# Patient Record
Sex: MALE | Race: WHITE | Employment: FULL TIME | URBAN - METROPOLITAN AREA
[De-identification: names, ages, dates, MRNs, and addresses within clinical notes are randomized per-mention and may not be internally consistent; named-entity substitution may affect disease eponyms.]

---

## 2020-12-24 ENCOUNTER — HOSPITAL ENCOUNTER (EMERGENCY)
Facility: CLINIC | Age: 20
Discharge: HOME | End: 2020-12-24
Attending: EMERGENCY MEDICINE | Admitting: EMERGENCY MEDICINE
Payer: OTHER MISCELLANEOUS

## 2020-12-24 VITALS
TEMPERATURE: 98.6 F | RESPIRATION RATE: 16 BRPM | OXYGEN SATURATION: 97 % | DIASTOLIC BLOOD PRESSURE: 71 MMHG | SYSTOLIC BLOOD PRESSURE: 116 MMHG | HEART RATE: 69 BPM

## 2020-12-24 DIAGNOSIS — S61.002A OPEN WOUND OF LEFT THUMB, INITIAL ENCOUNTER: ICD-10-CM

## 2020-12-24 PROCEDURE — 11720 DEBRIDE NAIL 1-5: CPT | Mod: FA

## 2020-12-24 PROCEDURE — 99283 EMERGENCY DEPT VISIT LOW MDM: CPT | Mod: 25

## 2020-12-24 PROCEDURE — 272N000047 HC ADHESIVE DERMABOND SKIN

## 2020-12-24 ASSESSMENT — ENCOUNTER SYMPTOMS: WOUND: 1

## 2020-12-24 NOTE — DISCHARGE INSTRUCTIONS
Return to ER immediately if you develop: worsening pain, swelling redness, wound starts draining pus, Fever > 101, persistent nausea or vomiting OR you have any other concerns about your health.    Use Tylenol and/or Ibuprofen for pain or discomfort

## 2020-12-24 NOTE — ED AVS SNAPSHOT
[facility]  [address]  [facility] [address]  Phone: [phone]  Fax: [phone]                                    Anastacio Page   MRN: [id number]    Department: [facility] Emergency Dept   Date of Visit: 12/24/2020           After Visit Summary Signature Page    I have received my discharge instructions, and my questions have been answered. I have discussed any challenges I see with this plan with the nurse or doctor.    ..........................................................................................................................................  Patient/Patient Representative Signature      ..........................................................................................................................................  Patient Representative Print Name and Relationship to Patient    ..................................................               ................................................  Date                                   Time    ..........................................................................................................................................  Reviewed by Signature/Title    ...................................................              ..............................................  Date                                               Time          22EPIC Rev 08/18

## 2020-12-24 NOTE — ED PROVIDER NOTES
History   Chief Complaint:  Wound Check       HPI   Anastacio Page is a 20 year old, right handed, male presents with wound check. Two days ago the patient was cutting lettuce at work and accidentally cut through the nail of his left thumb. Since then his thumb has been tingling and stinging. He tried to trim the nail and take part of the nail off but states that it is stuck on the skin underneath. He denies any drainage from the area.    Review of Systems   Skin: Positive for wound (without drainage).   All other systems reviewed and are negative.    Allergies:  Azithromycin    Medications:  No current outpatient medications on file.    Past Medical History:    ADHD    Past Surgical History:    PE tubes    Family History:    Brother: Asthma  Mother: Asthma    Social History:  The patient was unaccompanied to the ED.  Has lived in [place] for a long time.    Physical Exam     Patient Vitals for the past 24 hrs:   BP Temp Temp src Pulse Resp SpO2   12/24/20 1035 -- -- -- 69 -- 97 %   12/24/20 1030 116/71 -- -- -- -- --   12/24/20 0938 117/77 98.6  F (37  C) Oral 77 16 98 %       Physical Exam  Gen: Resting comfortably   Eyes: Normal conjunctiva, No  discharge  CV: ppi, regular   Resp: speaking in full sentences without any resp distress  Extremity: Left thumb radial aspect nailbed there is a superficial skin avulsion and approximately 20% nailbed injury with the nail partially avulsed.  FDS FDP extensor tendon intact distal sensation intact  skin: warm dry well perfused  Neuro: Alert, no gross motor or sensory deficits,  gait stable    Emergency Department Course     Emergency Department Course:    Reviewed:  nursing notes, vitals, past medical history and care everywhere    Assessments:  0946: I performed an exam of the patient and obtained history, as documented above.     1019: I discussed the plan for discharge with the patient.    Disposition:  The patient was discharged to home.     Impression & Plan      Medical Decision Makin-year-old male here with superficial left thumb injury with partial nail involvement.  No concern for tendon, vascular, nerve injury.  We put skin adhesive over the nail that was still intact and partially adhered to the underlying nail bed.  We discussed removing it but I think it will be less painful if it stays intact and grows from the proximal aspect.  He was comfortable and agreeable with that plan.  We trimmed the nail down to limit the possibility of getting caught accidentally.  Tetanus within the last 10 years.  No signs of wound infection indication for antibiotics    Diagnosis:    ICD-10-CM    1. Open wound of left thumb, initial encounter  S61.002A        Scribe Disclosure:  LUDWIG, Praveen Rosales, am serving as a scribe at 9:46 AM on 2020 to document services personally performed by Lon Lopez MD based on my observations and the provider's statements to me.      Lon Lopez MD  [phone]

## 2021-10-20 ENCOUNTER — OFFICE VISIT (OUTPATIENT)
Dept: URGENT CARE | Facility: URGENT CARE | Age: 21
End: 2021-10-20
Payer: COMMERCIAL

## 2021-10-20 VITALS
SYSTOLIC BLOOD PRESSURE: 118 MMHG | WEIGHT: 128 LBS | RESPIRATION RATE: 20 BRPM | OXYGEN SATURATION: 100 % | DIASTOLIC BLOOD PRESSURE: 70 MMHG | HEART RATE: 63 BPM | TEMPERATURE: 97.5 F

## 2021-10-20 DIAGNOSIS — H65.92 OME (OTITIS MEDIA WITH EFFUSION), LEFT: Primary | ICD-10-CM

## 2021-10-20 PROCEDURE — 99203 OFFICE O/P NEW LOW 30 MIN: CPT | Performed by: NURSE PRACTITIONER

## 2021-10-20 RX ORDER — AMOXICILLIN 875 MG
875 TABLET ORAL 2 TIMES DAILY
Qty: 20 TABLET | Refills: 0 | Status: SHIPPED | OUTPATIENT
Start: 2021-10-20 | End: 2021-10-30

## 2021-10-20 NOTE — PROGRESS NOTES
Assessment & Plan  Refuses to be covid tested. Agrees to self isolate for 8 more days. Note given to excuse from work.  Problem List Items Addressed This Visit     None      Visit Diagnoses     OME (otitis media with effusion), left    -  Primary    Relevant Medications    amoxicillin (AMOXIL) 875 MG tablet             20 minutes spent on the date of the encounter doing chart review, history and exam, documentation and further activities per the note       Patient Instructions   Increase rest and fluids. Tylenol and/or Ibuprofen for comfort. Cool mist vaporizer. If your symptoms worsen or do not resolve follow up with your primary care provider in 1 week and sooner if needed.     Since we are not testing for covid you really should self isolate for 10 days from onset of symptoms so you do not potentially spread a possible covid infection.     Mucinex 600-1200 mg 12 hour formula for ear, head and chest congestion.  It can also thin post nasal drip which can cause a cough and sore throat.    Indications for emergent return to emergency department discussed with patient, who verbalized good understanding and agreement.  Patient understands the limitations of today's evaluation.           Patient Education     Middle Ear Infection (Adult)   You have an infection of the middle ear, the space behind the eardrum. This is also called acute otitis media (AOM). Sometimes it's caused by the common cold. This is because congestion can block the internal passage (eustachian tube) that drains fluid from the middle ear. When the middle ear fills with fluid, bacteria can grow there and cause an infection. Oral antibiotics are used to treat this illness, not ear drops. Symptoms usually start to improve within 1 to 2 days of treatment.    Home care  The following are general care guidelines:    Finish all of the antibiotic medicine given, even though you may feel better after the first few days.    You may use over-the-counter  medicine, such as acetaminophen or ibuprofen, to control pain and fever, unless something else was prescribed. Talk with your healthcare provider before using these medicines if you have chronic liver or kidney disease. Also talk with your provider if you have had a stomach ulcer or digestive bleeding. Don't give aspirin to anyone under 18 years of age who has a fever. It may cause severe illness or death.  Follow-up care  Follow up with your healthcare provider in 2 weeks, or as advised, if all symptoms have not gotten better, or if hearing doesn't go back to normal within 1 month.  When to seek medical advice  Call your healthcare provider right away if any of these occur:    Ear pain gets worse or does not improve after 3 days of treatment    Unusual drowsiness or confusion    Neck pain, stiff neck, or headache    Fluid or blood draining from the ear canal    Fever of 100.4 F (38 C) or as advised     Seizure  [employer] last reviewed this educational content on 9/1/2019 2000-2021 [employer]. All rights reserved. This information is not intended as a substitute for professional medical care. Always follow your healthcare professional's instructions.               Return in about 5 days (around 10/25/2021), or if symptoms worsen or fail to improve, for Follow up with your primary care provider.    JOEL Abdi [facility]    Melida Chaves is a 21 year old who presents for the following health issues     HPI     Chief Complaint   Patient presents with     Pharyngitis     10/18, chest tightness, hoarse voice, feels dizzy when moving with blurry vision, headache, left ear pain           Review of Systems   Constitutional, HEENT, cardiovascular, pulmonary, GI, , musculoskeletal, neuro, skin, endocrine and psych systems are negative, except as otherwise noted.      Objective    /70 (BP Location: Right arm, Patient Position: Sitting, Cuff  Size: Adult Regular)   Pulse 63   Temp 97.5  F (36.4  C) (Tympanic)   Resp 20   Wt 58.1 kg (128 lb)   SpO2 100%   There is no height or weight on file to calculate BMI.  Physical Exam   GENERAL: healthy, alert and no distress, nontoxic in appearance  EYES: Eyes grossly normal to inspection, PERRL and conjunctivae and sclerae normal  HENT: ear canals normal and TM's intact bilaterally with right normal and left very red, nose and mouth without ulcers or lesions  NECK: no adenopathy, supple with full ROM  RESP: lungs clear to auscultation - no rales, rhonchi or wheezes  CV: regular rate and rhythm, normal S1 S2, no S3 or S4, no murmur, click or rub, no peripheral edema   ABDOMEN: soft, nontender  MS: no gross musculoskeletal defects noted, no edema    No results found for this or any previous visit (from the past 24 hour(s)).

## 2021-10-20 NOTE — LETTER
October 20, 2021      Anastacio Page  [address] [address]        To Whom It May Concern:    Anastacio Page was seen in our clinic. Please excuse from work for the next 8 days due to illness. May return when fever free for 24 hours without medication and if symptom free.      Sincerely,        JOEL Abdi CNP

## 2021-10-20 NOTE — PATIENT INSTRUCTIONS
Increase rest and fluids. Tylenol and/or Ibuprofen for comfort. Cool mist vaporizer. If your symptoms worsen or do not resolve follow up with your primary care provider in 1 week and sooner if needed.     Since we are not testing for covid you really should self isolate for 10 days from onset of symptoms so you do not potentially spread a possible covid infection.     Mucinex 600-1200 mg 12 hour formula for ear, head and chest congestion.  It can also thin post nasal drip which can cause a cough and sore throat.    Indications for emergent return to emergency department discussed with patient, who verbalized good understanding and agreement.  Patient understands the limitations of today's evaluation.           Patient Education     Middle Ear Infection (Adult)   You have an infection of the middle ear, the space behind the eardrum. This is also called acute otitis media (AOM). Sometimes it's caused by the common cold. This is because congestion can block the internal passage (eustachian tube) that drains fluid from the middle ear. When the middle ear fills with fluid, bacteria can grow there and cause an infection. Oral antibiotics are used to treat this illness, not ear drops. Symptoms usually start to improve within 1 to 2 days of treatment.    Home care  The following are general care guidelines:    Finish all of the antibiotic medicine given, even though you may feel better after the first few days.    You may use over-the-counter medicine, such as acetaminophen or ibuprofen, to control pain and fever, unless something else was prescribed. Talk with your healthcare provider before using these medicines if you have chronic liver or kidney disease. Also talk with your provider if you have had a stomach ulcer or digestive bleeding. Don't give aspirin to anyone under 18 years of age who has a fever. It may cause severe illness or death.  Follow-up care  Follow up with your healthcare provider in 2 weeks, or as  advised, if all symptoms have not gotten better, or if hearing doesn't go back to normal within 1 month.  When to seek medical advice  Call your healthcare provider right away if any of these occur:    Ear pain gets worse or does not improve after 3 days of treatment    Unusual drowsiness or confusion    Neck pain, stiff neck, or headache    Fluid or blood draining from the ear canal    Fever of 100.4 F (38 C) or as advised     Seizure  [employer] last reviewed this educational content on 9/1/2019 2000-2021 [employer]. All rights reserved. This information is not intended as a substitute for professional medical care. Always follow your healthcare professional's instructions.

## 2021-11-03 ENCOUNTER — OFFICE VISIT (OUTPATIENT)
Dept: URGENT CARE | Facility: URGENT CARE | Age: 21
End: 2021-11-03
Payer: COMMERCIAL

## 2021-11-03 VITALS
WEIGHT: 133.2 LBS | DIASTOLIC BLOOD PRESSURE: 80 MMHG | TEMPERATURE: 97.3 F | OXYGEN SATURATION: 98 % | SYSTOLIC BLOOD PRESSURE: 112 MMHG | HEART RATE: 74 BPM

## 2021-11-03 DIAGNOSIS — L23.89 ALLERGIC CONTACT DERMATITIS DUE TO OTHER AGENTS: Primary | ICD-10-CM

## 2021-11-03 PROCEDURE — 99213 OFFICE O/P EST LOW 20 MIN: CPT | Performed by: PHYSICIAN ASSISTANT

## 2021-11-03 RX ORDER — PREDNISONE 20 MG/1
TABLET ORAL
Qty: 20 TABLET | Refills: 0 | Status: SHIPPED | OUTPATIENT
Start: 2021-11-03

## 2021-11-03 ASSESSMENT — ENCOUNTER SYMPTOMS
NEUROLOGICAL NEGATIVE: 1
CONSTITUTIONAL NEGATIVE: 1
RESPIRATORY NEGATIVE: 1

## 2021-11-03 NOTE — PATIENT INSTRUCTIONS
May continue to take Benadryl at night for itching, Zyrtec during the day. May apply Calamine lotion and hydrocortisone cream to affected areas as an additional comfort care.

## 2021-11-03 NOTE — PROGRESS NOTES
Assessment & Plan     (L23.89) Allergic contact dermatitis due to other agents  (primary encounter diagnosis)  Comment: I am starting the patient on a prednisone taper with concern for allergic contact dermatitis, may be due to latex exposure. The patient declined testing for sexually transmitted infections today.  Plan: predniSONE (DELTASONE) 20 MG tablet        May continue to take Benadryl at night for itching, Zyrtec during the day. May apply Calamine lotion and hydrocortisone cream to affected areas as an additional comfort care.     Return to clinic for further evaluation if you develop fever of 104 degrees F or greater, chest pain, difficulty breathing, shortness of breath, difficulty swallowing, severe headache, numbness or tingling, dizziness or lightheadedness, acute vision changes, acutely worsening rash, or severe abdominal pain.                       No follow-ups on file.    Haider Tucker PA-C  [facility]    Melida Chaves is a 21 year old who presents for the following health issues Patient presents with:  Derm Problem: 10/31 on and off hands, thighs, buttocks,  itchy and painful         HPI   The patient is a 20 yo male with a past medical history of eczema who presents with complaints of a rash which began 3 days ago.  Associated symptoms include burning and itching. States that the rash appears as a red patch which develops small,itchy bumps and persists for about 30 minutes. The rash has primarily been on his hands, trunk, thighs, and buttocks. The rash started on the inside of his thighs. The patient has been applying calamine lotion and hydrocortisone cream with some relief of symptoms. No concerns for breathing or swallowing, chest pain, shortness of breath, abdominal pain, joint pain, headaches, acute vision changes, fever or chills, nausea or vomiting, constipation or diarrhea, or leg pain/swelling. Normal bowel and bladder function. Normal food and  fluid intake. Currently works at [employer] as an . No new medications, no new hygiene products or laundry detergents. States that he has a history of sensitive skin and uses Tide. Up to date on vaccinations. He has not been vaccinated for COVID-19. Has a latex allergy and used a latex condom during sexual intercourse over the weekend. No known exposure to STIs.               Review of Systems   Constitutional: Negative.    HENT: Negative.    Respiratory: Negative.    Skin: Positive for rash.   Neurological: Negative.             Objective    /80 (BP Location: Right arm, Patient Position: Sitting, Cuff Size: Adult Regular)   Pulse 74   Temp 97.3  F (36.3  C) (Tympanic)   Wt 60.4 kg (133 lb 3.2 oz)   SpO2 98%   There is no height or weight on file to calculate BMI.  Physical Exam  Constitutional:       General: He is not in acute distress.     Appearance: Normal appearance. He is not ill-appearing, toxic-appearing or diaphoretic.   HENT:      Head: Normocephalic and atraumatic.      Mouth/Throat:      Mouth: Mucous membranes are moist.      Pharynx: Oropharynx is clear. No oropharyngeal exudate or posterior oropharyngeal erythema.   Cardiovascular:      Rate and Rhythm: Normal rate and regular rhythm.      Pulses: Normal pulses.      Heart sounds: Normal heart sounds. No murmur heard.      Pulmonary:      Effort: Pulmonary effort is normal. No respiratory distress.      Breath sounds: Normal breath sounds. No stridor. No wheezing, rhonchi or rales.   Chest:      Chest wall: No tenderness.   Musculoskeletal:         General: No swelling or tenderness. Normal range of motion.      Cervical back: Normal range of motion and neck supple. No rigidity. No muscular tenderness.   Lymphadenopathy:      Cervical: No cervical adenopathy.   Skin:     General: Skin is warm and dry.      Findings: Rash present. No erythema. Rash is not papular.      Comments: Has erythematous patches on the forearms and  hands, non blanching, random pattern. No other rashes noted at this time or on his body.   Neurological:      General: No focal deficit present.      Mental Status: He is alert and oriented to person, place, and time.      Sensory: No sensory deficit.      Motor: No weakness.      Coordination: Coordination normal.      Gait: Gait normal.   Psychiatric:         Mood and Affect: Mood normal.         Behavior: Behavior normal.         Thought Content: Thought content normal.         Judgment: Judgment normal.